# Patient Record
Sex: FEMALE | HISPANIC OR LATINO
[De-identification: names, ages, dates, MRNs, and addresses within clinical notes are randomized per-mention and may not be internally consistent; named-entity substitution may affect disease eponyms.]

---

## 2021-03-04 ENCOUNTER — RX ONLY (OUTPATIENT)
Age: 41
Setting detail: RX ONLY
End: 2021-03-04

## 2021-03-04 ENCOUNTER — APPOINTMENT (RX ONLY)
Dept: URBAN - METROPOLITAN AREA CLINIC 374 | Facility: CLINIC | Age: 41
Setting detail: DERMATOLOGY
End: 2021-03-04

## 2021-03-04 DIAGNOSIS — L70.0 ACNE VULGARIS: ICD-10-CM

## 2021-03-04 DIAGNOSIS — H02.6 XANTHELASMA OF EYELID: ICD-10-CM

## 2021-03-04 PROBLEM — H02.60 XANTHELASMA OF UNSPECIFIED EYE, UNSPECIFIED EYELID: Status: ACTIVE | Noted: 2021-03-04

## 2021-03-04 PROCEDURE — 99203 OFFICE O/P NEW LOW 30 MIN: CPT | Mod: 25

## 2021-03-04 PROCEDURE — ? PRESCRIPTION

## 2021-03-04 PROCEDURE — ? COUNSELING

## 2021-03-04 PROCEDURE — ? PHOTO-DOCUMENTATION

## 2021-03-04 PROCEDURE — ? PRESCRIPTION MEDICATION MANAGEMENT

## 2021-03-04 PROCEDURE — 17110 DESTRUCTION B9 LES UP TO 14: CPT

## 2021-03-04 PROCEDURE — ? BENIGN DESTRUCTION

## 2021-03-04 RX ORDER — TAZAROTENE 0.45 MG/G
1 LOTION TOPICAL QHS
Qty: 1 | Refills: 3 | Status: CANCELLED | COMMUNITY
Start: 2021-03-04

## 2021-03-04 RX ORDER — TAZAROTENE 0.45 MG/G
1 LOTION TOPICAL QHS
Qty: 1 | Refills: 3 | Status: ERX

## 2021-03-04 RX ADMIN — TAZAROTENE 1: 0.45 LOTION TOPICAL at 00:00

## 2021-03-04 ASSESSMENT — LOCATION DETAILED DESCRIPTION DERM
LOCATION DETAILED: LEFT SUPERIOR MEDIAL MALAR CHEEK
LOCATION DETAILED: LEFT INFERIOR CENTRAL MALAR CHEEK
LOCATION DETAILED: RIGHT MEDIAL MALAR CHEEK
LOCATION DETAILED: RIGHT SUPERIOR MEDIAL MALAR CHEEK

## 2021-03-04 ASSESSMENT — LOCATION SIMPLE DESCRIPTION DERM
LOCATION SIMPLE: RIGHT CHEEK
LOCATION SIMPLE: LEFT CHEEK

## 2021-03-04 ASSESSMENT — LOCATION ZONE DERM: LOCATION ZONE: FACE

## 2021-03-04 NOTE — PROCEDURE: BENIGN DESTRUCTION
Medical Necessity Clause: This procedure was medically necessary because the lesions that were treated were:
Render Note In Bullet Format When Appropriate: No
Medical Necessity Information: It is in your best interest to select a reason for this procedure from the list below. All of these items fulfill various CMS LCD requirements except the new and changing color options.
Consent: The patient's consent was obtained including but not limited to risks of crusting, scabbing, blistering, scarring, darker or lighter pigmentary change, recurrence, incomplete removal and infection.
Treatment Number (Will Not Render If 0): 0
Detail Level: Detailed
Post-Care Instructions: I reviewed with the patient in detail post-care instructions. Patient is to wear sunprotection, and avoid picking at any of the treated lesions. Pt may apply Vaseline to crusted or scabbing areas.

## 2021-03-04 NOTE — PROCEDURE: PRESCRIPTION MEDICATION MANAGEMENT
Render In Strict Bullet Format?: No
Detail Level: Zone
Initiate Treatment: Arazlo 0.045 % lotion Qhs: apply a pea size amount QHS to acne of face

## 2025-07-17 ENCOUNTER — OFFICE VISIT (OUTPATIENT)
Facility: HOSPITAL | Age: 45
End: 2025-07-17
Payer: COMMERCIAL

## 2025-07-17 VITALS
HEIGHT: 69 IN | HEART RATE: 76 BPM | OXYGEN SATURATION: 98 % | WEIGHT: 167 LBS | BODY MASS INDEX: 24.73 KG/M2 | DIASTOLIC BLOOD PRESSURE: 80 MMHG | SYSTOLIC BLOOD PRESSURE: 110 MMHG

## 2025-07-17 DIAGNOSIS — G43.111 INTRACTABLE MIGRAINE WITH AURA WITH STATUS MIGRAINOSUS: ICD-10-CM

## 2025-07-17 DIAGNOSIS — G43.011 INTRACTABLE MIGRAINE WITHOUT AURA AND WITH STATUS MIGRAINOSUS: Primary | ICD-10-CM

## 2025-07-17 DIAGNOSIS — D35.2 PITUITARY ADENOMA (CMS/HCC): ICD-10-CM

## 2025-07-17 PROCEDURE — 3008F BODY MASS INDEX DOCD: CPT | Performed by: PSYCHIATRY & NEUROLOGY

## 2025-07-17 PROCEDURE — 99205 OFFICE O/P NEW HI 60 MIN: CPT | Performed by: PSYCHIATRY & NEUROLOGY

## 2025-07-17 RX ORDER — RIZATRIPTAN BENZOATE 10 MG/1
TABLET ORAL
Qty: 12 TABLET | Refills: 11 | Status: SHIPPED | OUTPATIENT
Start: 2025-07-17

## 2025-07-17 NOTE — PROGRESS NOTES
Patient ID: Mery Beaulieu                              : 1980  MRN: [id number]                                            VISIT DATE: 2025   ENCOUNTER PROVIDER: Monique Astudillo  REFERRING PROVIDER: Charleen Wilde DO    I had the pleasure of evaluating Mery Beaulieu in the [facility] on 2025 for a new patient visit. The history was provided by Mery Beaulieu and supplemented by her medical records.    CHIEF COMPLAINT: Headache.    HISTORY OF PRESENT ILLNESS:  Mery developed headaches at age 14. The headaches started without known precipitating event (i.e. illness, new medications, head/neck injury, or significant stressor) the year after menarche. She has a sister that also has migraine headaches. The headaches have always been intermittent and the pattern and features have not changed, but over the last year they have become more intense and incapacitating.    Importantly, she also has a pituitary adenoma that measured 7 mm in 2019. She thinks she may have had another MRI since then, but we don't have any records of that one.      She was seen at [facility] in 2019.    Headache Semiology:  Frequency: once a month, lasting 2-3 days. Headache free otherwise.   Location: bilateral, starts in the neck and trapezius and gradually radiates to the sides of the head, temples and eyes.   Quality: pressure, throbbing, stabbing.  Severity: severe and incapacitating  Duration: 48-72 hours  Timing or pattern: no  Aggravation by regular physical activity: yes  Associated features: photophobia, phonophobia, osmophobia, nausea, and vomiting. Sore stiff muscles in the neck at the beginning of headaches.  Presence of aura: sometimes sees a light or bright spot at the beginning and this resolves in 1-2 hours. No focal neurologic symptoms.  No unilateral cranial autonomic symptoms (lacrimation, conjunctival injection, nasal congestion or rhinorrhea, ptosis, eyelid edema,  forehead/facial sweating, miosis) restlessness or agitation.   Positional headache: no   Precipitated by Valsalva maneuvers: no  Neck pain: only with the migraine episodes  Relieving factors: rest and ice  Exacerbating factors: as above  Related to menstrual cycle: sometimes, but not consistently   Other triggers: unknown  Disability: Unable to perform usual activities (work/school/family/social) completely or partially when headache is severe.      PAST TREATMENTS/MEDICATIONS:  Preventive:  None tried prior to initial visit  Acute or as needed:  Sumigran plus (ergotamine 1 mg, ibuprofen 400 mg and caffeine 50 mg) worked x 1. This is OTC in [facility].   Tried something years ago that caused nausea - probably sumatriptan.  Excedrin did not help  Tylenol did not help  Ibuprofen did not help  Naproxen did not help  IV medications/Hospitalizations:  N/A  Non-Pharmacologic:  N/A    MEDICATIONS AT START OF VISIT:    Current Outpatient Medications:     rizatriptan (MAXALT) 10 mg tablet, 1 tab at onset of severe headache. May repeat in 2 hours if needed. Maximum 2 tab/day and 2 days/week., Disp: 12 tablet, Rfl: 11    multivit comb no.63-folic acid 400 mcg capsule, Take 1 capsule by mouth daily., Disp: , Rfl:     ALLERGIES: has no known allergies.     REVIEW OF SYSTEMS: As discussed above. Sometimes constipation. Otherwise, all other ROS were reviewed and negative.    PAST MEDICAL HISTORY:  has no past medical history on file. Negative.    PAST SURGICAL HISTORY:  has no past surgical history on file. Negative.     FAMILY HISTORY: family history is not on file. Migriane in one sister.    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    She is from [place]    3 children.     Mery is not planing to have mor children.  had a  vasectomy. I have discussed with her the possible teratogenic effects of some medications and she verbalized understanding.     PHYSICAL EXAMINATION:    Vitals:    07/17/25 1006   BP: 110/80   Pulse: 76   SpO2: 98%      General: Well developed, well nourished, in no acute distress.  Craniofacial examination: Normal. No evidence of temporomandibular joint disease. No trigger points.    Neck: Full range of motion. No trigger points.     NEUROLOGICAL EXAM:  Alert and oriented. Normal attention span and concentration. Normal language and speech.  Cranial nerves:   Ophthalmoscopic examination normal with sharp disc margins bilaterally.   II-VI: Pupils were equal, round, and reactive to light and accommodation. Extraocular movements were intact without nystagmus.  V: Intact sensation to light touch in the distribution of the three trigeminal branches.   VII: Face was symmetric with normal strength.   VIII: Air conduction intact bilaterally.  IX, X: Uvula midline, palate contracts and elevates symmetrically, normal voice.  XI: Normal shoulder elevation and head turning.  XII: Tongue protrudes midline, normal bulk and without fasciculations.   Muscle strength in upper and lower extremities: 5/5 throughout symmetrically. Pronator drift was negative.  Muscle tone in upper and lower extremities was normal.   There were no abnormal movements.  Deep tendon reflexes in upper and lower extremities: 2+ symmetrically throughout.   Coordination: intact bilaterally to finger-nose testing.  Sensation: intact to light touch in four extremities.  Gait: narrow based, normal tandem walk, and negative Romberg.       Data Reviewed:   MRI BRAIN W WO CONTRAST (6/18/2019) [facility]  INDICATION: Pituitary microadenoma, with attn to sella   COMPARISON: None at this institution   IMPRESSION: Some asymmetry of a nonenlarged pituitary gland, without a discrete enhancement defect identified.   TECHNIQUE: MR imaging of the brain with attention to the  "sella was obtained before and after administration of intravenous contrast (15 ml of Dotarem ).   FINDINGS: Ventricles and sulci are normal in size and configuration for the patient's age. There is no acute parenchymal hemorrhage, extra-axial fluid collection, mass effect, midline shift, territorial infarct, or hydrocephalus. There is no restricted diffusion to suggest a recent infarct. The cerebellar tonsils are normally positioned. The major vessel flow-voids are preserved at the skull base. Images of the brain obtained after contrast administration show no areas of abnormal parenchymal enhancement. The sella is not enlarged, the pituitary gland is not enlarged by size criteria; the maximum craniocaudal height of the gland a 7 mm. The left side of the gland is somewhat larger than the right, with subtle convexity inferiorly. The stalk is nearly midline. The cavernous sinuses and optic chiasm are unremarkable. It is difficult to identify enhancement defect within the pituitary gland. Correlate with remote outside imaging would be helpful. The nasopharynx is symmetric. Paranasal sinuses are mostly clear. Right mastoid is not well developed. There is scattered mastoid mucosal thickening. No gross orbital mass is seen. Bone marrow signal is age-appropriate.       No labs available.    Neuro note (2019) Dr. Strickland. [facility].  \"Assessment & Plan  Mery is currently a 38-year-old right-handed woman who we follow in the office for episodic menstrually associated migraine headaches. We discussed options. I do not believe she needs a daily preventive medicine currently. We asked that she attempt Frova 2.5 mg p.o. b.i.d. through her premenstrual time frame in hopes that prevent taking her menstrually associated migraine headaches. We reviewed in detail the potential side effects and she appears to understand.  She has a pituitary microadenoma in her last imaging study was back in 2012. We have asked that she " "undergo a follow-up MRI of the brain with attention to the sella to ensure stability. I asked her to call me after this study is done. Thank you for allowing us to participate in the care of your patient.\"    IMPRESSION/PLAN:  Mery Beaulieu is a very pleasant 45 y.o. woman with a pituitary adenoma seen initially in July 2025 for intermittent severe headaches that started at age 14. Neurological exam was unremarkable. Brain MRI (2019) revealed 7 mm pituitary adenoma. There are no atypical features or symptoms suggestive of a serious underlying condition or secondary headache.     In my opinion, she has episodic migraine without aura and with visual aura.     At this time, I recommend a trial of rizatriptan for acute treatment. She does not need preventive medications at this time.  I ordered a pituitary MRI, prolactin level and made a referral to [facility]. See detailed recommendations below.    Diagnosis:  Episodic migraine without aura and with visual aura  Pituitary adenoma     Evaluation:  Pituitary MRI ordered today.  Prolactin level ordered today  Referral to [facility] (Dr. Wilde) at [place] made today - Call to make appointment [phone]   No additional tests are needed at this time. If there are new symptoms or changes in the characteristics of the headaches, I will re-evaluate Mery and consider if diagnostic tests are needed.     Treatment plan:      1. Healthy Habits: The following recommendations can greatly reduce the number and severity of headaches.  Maintain regular sleep hours and get sufficient sleep (8-9 hours). Consider using the free audrey \"Insomnia \" if you have sleep problems.  Do not skip meals, especially breakfast  Drink at least 64 oz or 8 cups of water daily - enough to urinate 5-6 times a day  Get at least 30 minutes of daily aerobic exercise (enough to increase your heart rate and sweat)    We recommend keeping track of the headaches and acute medication (prescription or " over-the-counter). May use the free audrey [facility] Migraine Tracker or any other method (notebook, calendar, etc) to log the headaches and use of medication. Please, bring that to your clinic visits.    2. Acute Treatment (limited to 2 days per week, in general):     Trial of rizatriptan 10 mg as needed at onset of moderate to severe headache. May repeat once 2 hours later. Maximum 2 doses in 24 hr. Limit to 2 days/week. Recommend taking only half a tablet if it is the first time you try this medication. After that, if there are no side effects, a full tablet can be used.    Future consideration: Other triptans, Ubrelvy (ubrogepant), or Nurtec (rimegepant).  I may prescribe a different triptan if needed before the next visit.    Reyvow (lasmiditan), Sprix (ketorolac) nasal spray, Zavzpret (zavegepant) nasal spray, or Trudhesa (dihydroergotamine) nasal spray can be considered for acute/rescue treatment.     We may add an antinausea medication if needed for nausea or as co-adjuvant if monotherapy does not provide enough relief.       3. Preventive Treatment (when headaches occur more than 1 day/week or interfere with functioning):     Not indicated at this time based on current headache frequency.    Future consideration: nutraceuticals    Nutraceutical options include: riboflavin, magnesium, melatonin, feverfew, and coenzyme Q10.     Options for oral preventive medications include: amitriptyline or nortriptyline, betablockers, topiramate, zonisamide, atogepant (Qulipta), rimegepant (Nurtec), valproate, verapamil, gabapentin, pregabaline, duloxetine, candesartan, namenda, escitalopram, venlafaxine, and levetiracetam.     Procedures that can be used to prevent headaches include: botox (if headaches occur 15 or more days/month), nerve blocks and trigger point injections. Most insurances require trying at least 2 oral preventive medications before they will cover botox.    Anti CGRP monoclonal antibodies (Erenumab or  Aimovig, Fremanezumab or Ajovy, Galcanezumab or Emgality, and Eptinezumab or Vyepti) are a group of biological injectable treatments approved for prevention of migraine. Most insurances require trying at least 2 oral preventive medications before they will cover any of the anti-CGRP antibodies.     Several non-invasive neuromodulation devices (GammaCore, Cefaly and Nerivio) are available to treat headaches preventively and/or acutely. These are typically not covered by insurance, but the companies have a trial period and will refund the cost of the device if ineffective and returned within the trial period.     Follow-up in the Headache Clinic in 4-6 months.     We appreciate the opportunity to participate in the care of Mery.     Please, do not hesitate to call me at [phone] if you have any questions or concerns.         Monique Beaulieu MD  [facility]  [phone]    I spent 63 minutes on this date of service performing the following activities: obtaining history, performing examination, entering orders, documenting, preparing for visit, obtaining / reviewing records, and providing counseling and education.

## 2025-07-17 NOTE — LETTER
2025     Charleen Wilde DO  [address]  [facility] [address]    Patient: Mery Beaulieu  YOB: 1980  Date of Visit: 2025      Dear Dr. Wilde:    Thank you for referring Mery Beaulieu to me for evaluation. Below are my notes for this consultation.    If you have questions, please do not hesitate to call me. I look forward to following your patient along with you.         Sincerely,        Monique Astudillo MD        CC: MD Baudilio Mosquera Ana, MD  2025 11:15 AM  Sign when Signing Visit  Patient ID: Mery Beaulieu                              : 1980  MRN: [id number]                                            VISIT DATE: 2025   ENCOUNTER PROVIDER: Monique Astudillo  REFERRING PROVIDER: Charleen Wilde DO    I had the pleasure of evaluating Mery Beaulieu in the [facility] on 2025 for a new patient visit. The history was provided by Mery Beaulieu and supplemented by her medical records.    CHIEF COMPLAINT: Headache.    HISTORY OF PRESENT ILLNESS:  Mery developed headaches at age 14. The headaches started without known precipitating event (i.e. illness, new medications, head/neck injury, or significant stressor) the year after menarche. She has a sister that also has migraine headaches. The headaches have always been intermittent and the pattern and features have not changed, but over the last year they have become more intense and incapacitating.    Importantly, she also has a pituitary adenoma that measured 7 mm in 2019. She thinks she may have had another MRI since then, but we don't have any records of that one.      She was seen at [facility] in 2019.    Headache Semiology:  Frequency: once a month, lasting 2-3 days. Headache free otherwise.   Location: bilateral, starts in the neck and trapezius and gradually radiates to the sides of the head, temples and eyes.   Quality:  pressure, throbbing, stabbing.  Severity: severe and incapacitating  Duration: 48-72 hours  Timing or pattern: no  Aggravation by regular physical activity: yes  Associated features: photophobia, phonophobia, osmophobia, nausea, and vomiting. Sore stiff muscles in the neck at the beginning of headaches.  Presence of aura: sometimes sees a light or bright spot at the beginning and this resolves in 1-2 hours. No focal neurologic symptoms.  No unilateral cranial autonomic symptoms (lacrimation, conjunctival injection, nasal congestion or rhinorrhea, ptosis, eyelid edema, forehead/facial sweating, miosis) restlessness or agitation.   Positional headache: no   Precipitated by Valsalva maneuvers: no  Neck pain: only with the migraine episodes  Relieving factors: rest and ice  Exacerbating factors: as above  Related to menstrual cycle: sometimes, but not consistently   Other triggers: unknown  Disability: Unable to perform usual activities (work/school/family/social) completely or partially when headache is severe.      PAST TREATMENTS/MEDICATIONS:  Preventive:  None tried prior to initial visit  Acute or as needed:  Sumigran plus (ergotamine 1 mg, ibuprofen 400 mg and caffeine 50 mg) worked x 1. This is OTC in [facility].   Tried something years ago that caused nausea - probably sumatriptan.  Excedrin did not help  Tylenol did not help  Ibuprofen did not help  Naproxen did not help  IV medications/Hospitalizations:  N/A  Non-Pharmacologic:  N/A    MEDICATIONS AT START OF VISIT:    Current Outpatient Medications:   •  rizatriptan (MAXALT) 10 mg tablet, 1 tab at onset of severe headache. May repeat in 2 hours if needed. Maximum 2 tab/day and 2 days/week., Disp: 12 tablet, Rfl: 11  •  multivit comb no.63-folic acid 400 mcg capsule, Take 1 capsule by mouth daily., Disp: , Rfl:     ALLERGIES: has no known allergies.     REVIEW OF SYSTEMS: As discussed above. Sometimes constipation. Otherwise, all other ROS were reviewed and  negative.    PAST MEDICAL HISTORY:  has no past medical history on file. Negative.    PAST SURGICAL HISTORY:  has no past surgical history on file. Negative.     FAMILY HISTORY: family history is not on file. Migriane in one sister.    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    She is from [place]    3 children.     Mery is not planing to have mor children.  had a vasectomy. I have discussed with her the possible teratogenic effects of some medications and she verbalized understanding.     PHYSICAL EXAMINATION:    Vitals:    07/17/25 1006   BP: 110/80   Pulse: 76   SpO2: 98%      General: Well developed, well nourished, in no acute distress.  Craniofacial examination: Normal. No evidence of temporomandibular joint disease. No trigger points.    Neck: Full range of motion. No trigger points.     NEUROLOGICAL EXAM:  Alert and oriented. Normal attention span and concentration. Normal language and speech.  Cranial nerves:   Ophthalmoscopic examination normal with sharp disc margins bilaterally.   II-VI: Pupils were equal, round, and reactive to light and accommodation. Extraocular movements were intact without nystagmus.  V: Intact sensation to light touch in the distribution of the three trigeminal branches.   VII: Face was symmetric with normal strength.   VIII: Air conduction intact bilaterally.  IX, X: Uvula midline, palate contracts and elevates symmetrically, normal voice.  XI: Normal shoulder elevation and head turning.  XII: Tongue protrudes midline, normal bulk and without fasciculations.   Muscle strength in upper and lower extremities: 5/5 throughout symmetrically. Pronator drift was negative.  Muscle tone in upper and lower extremities was normal.   There were no abnormal movements.  Deep tendon  reflexes in upper and lower extremities: 2+ symmetrically throughout.   Coordination: intact bilaterally to finger-nose testing.  Sensation: intact to light touch in four extremities.  Gait: narrow based, normal tandem walk, and negative Romberg.       Data Reviewed:   MRI BRAIN W WO CONTRAST (6/18/2019) [facility]  INDICATION: Pituitary microadenoma, with attn to sella   COMPARISON: None at this institution   IMPRESSION: Some asymmetry of a nonenlarged pituitary gland, without a discrete enhancement defect identified.   TECHNIQUE: MR imaging of the brain with attention to the sella was obtained before and after administration of intravenous contrast (15 ml of Dotarem ).   FINDINGS: Ventricles and sulci are normal in size and configuration for the patient's age. There is no acute parenchymal hemorrhage, extra-axial fluid collection, mass effect, midline shift, territorial infarct, or hydrocephalus. There is no restricted diffusion to suggest a recent infarct. The cerebellar tonsils are normally positioned. The major vessel flow-voids are preserved at the skull base. Images of the brain obtained after contrast administration show no areas of abnormal parenchymal enhancement. The sella is not enlarged, the pituitary gland is not enlarged by size criteria; the maximum craniocaudal height of the gland a 7 mm. The left side of the gland is somewhat larger than the right, with subtle convexity inferiorly. The stalk is nearly midline. The cavernous sinuses and optic chiasm are unremarkable. It is difficult to identify enhancement defect within the pituitary gland. Correlate with remote outside imaging would be helpful. The nasopharynx is symmetric. Paranasal sinuses are mostly clear. Right mastoid is not well developed. There is scattered mastoid mucosal thickening. No gross orbital mass is seen. Bone marrow signal is age-appropriate.       No labs available.    Neuro note (2019) Dr. Strickland. [facility].  \"Assessment & Plan  Mery is currently a 38-year-old right-handed woman who we follow in the office for episodic menstrually associated migraine headaches. We discussed options. I do not believe she needs a daily preventive medicine currently. We asked that she attempt Frova 2.5 mg p.o. b.i.d. through her premenstrual time frame in hopes that prevent taking her menstrually associated migraine headaches. We reviewed in detail the potential side effects and she appears to understand.  She has a pituitary microadenoma in her last imaging study was back in 2012. We have asked that she undergo a follow-up MRI of the brain with attention to the sella to ensure stability. I asked her to call me after this study is done. Thank you for allowing us to participate in the care of your patient.\"    IMPRESSION/PLAN:  Mery Beaulieu is a very pleasant 45 y.o. woman with a pituitary adenoma seen initially in July 2025 for intermittent severe headaches that started at age 14. Neurological exam was unremarkable. Brain MRI (2019) revealed 7 mm pituitary adenoma. There are no atypical features or symptoms suggestive of a serious underlying condition or secondary headache.     In my opinion, she has episodic migraine without aura and with visual aura.     At this time, I recommend a trial of rizatriptan for acute treatment. She does not need preventive medications at this time.  I ordered a pituitary MRI, prolactin level and made a referral to [facility]. See detailed recommendations below.    Diagnosis:  Episodic migraine without aura and with visual aura  Pituitary adenoma     Evaluation:  Pituitary MRI ordered today.  Prolactin level ordered today  Referral to NSG (Dr. Wilde) at [place] made today - Call to make appointment [phone]   No additional tests are needed at this time. If there are new symptoms or changes in the characteristics of the headaches, I will re-evaluate Mery and consider if diagnostic tests are " "needed.     Treatment plan:      1. Healthy Habits: The following recommendations can greatly reduce the number and severity of headaches.  Maintain regular sleep hours and get sufficient sleep (8-9 hours). Consider using the free audrey \"Insomnia \" if you have sleep problems.  Do not skip meals, especially breakfast  Drink at least 64 oz or 8 cups of water daily - enough to urinate 5-6 times a day  Get at least 30 minutes of daily aerobic exercise (enough to increase your heart rate and sweat)    We recommend keeping track of the headaches and acute medication (prescription or over-the-counter). May use the free audrey [place] Migraine Tracker or any other method (notebook, calendar, etc) to log the headaches and use of medication. Please, bring that to your clinic visits.    2. Acute Treatment (limited to 2 days per week, in general):     Trial of rizatriptan 10 mg as needed at onset of moderate to severe headache. May repeat once 2 hours later. Maximum 2 doses in 24 hr. Limit to 2 days/week. Recommend taking only half a tablet if it is the first time you try this medication. After that, if there are no side effects, a full tablet can be used.    Future consideration: Other triptans, Ubrelvy (ubrogepant), or Nurtec (rimegepant).  I may prescribe a different triptan if needed before the next visit.    Reyvow (lasmiditan), Sprix (ketorolac) nasal spray, Zavzpret (zavegepant) nasal spray, or Trudhesa (dihydroergotamine) nasal spray can be considered for acute/rescue treatment.     We may add an antinausea medication if needed for nausea or as co-adjuvant if monotherapy does not provide enough relief.       3. Preventive Treatment (when headaches occur more than 1 day/week or interfere with functioning):     Not indicated at this time based on current headache frequency.    Future consideration: nutraceuticals    Nutraceutical options include: riboflavin, magnesium, melatonin, feverfew, and coenzyme Q10.     Options " for oral preventive medications include: amitriptyline or nortriptyline, betablockers, topiramate, zonisamide, atogepant (Qulipta), rimegepant (Nurtec), valproate, verapamil, gabapentin, pregabaline, duloxetine, candesartan, namenda, escitalopram, venlafaxine, and levetiracetam.     Procedures that can be used to prevent headaches include: botox (if headaches occur 15 or more days/month), nerve blocks and trigger point injections. Most insurances require trying at least 2 oral preventive medications before they will cover botox.    Anti CGRP monoclonal antibodies (Erenumab or Aimovig, Fremanezumab or Ajovy, Galcanezumab or Emgality, and Eptinezumab or Vyepti) are a group of biological injectable treatments approved for prevention of migraine. Most insurances require trying at least 2 oral preventive medications before they will cover any of the anti-CGRP antibodies.     Several non-invasive neuromodulation devices (GammaCore, Cefaly and Nerivio) are available to treat headaches preventively and/or acutely. These are typically not covered by insurance, but the companies have a trial period and will refund the cost of the device if ineffective and returned within the trial period.     Follow-up in the Headache Clinic in 4-6 months.     We appreciate the opportunity to participate in the care of Mery.     Please, do not hesitate to call me at [phone] if you have any questions or concerns.         Monique Beaulieu MD  [facility]  [phone]    I spent 63 minutes on this date of service performing the following activities: obtaining history, performing examination, entering orders, documenting, preparing for visit, obtaining / reviewing records, and providing counseling and education.

## 2025-07-17 NOTE — PATIENT INSTRUCTIONS
"Diagnosis:  Episodic migraine without aura and with visual aura     Evaluation:  Pituitary MRI ordered today.  Prolactin level ordered today  Ref[place] [facility] [place] made today - Call to make appointment [phone]   No additional tests are needed at this time. If there are new symptoms or changes in the characteristics of the headaches, I will re-evaluate Mery and consider if diagnostic tests are needed.     Treatment plan:      1. Healthy Habits: The following recommendations can greatly reduce the number and severity of headaches.  Maintain regular sleep hours and get sufficient sleep (8-9 hours). Consider using the free audrey \"Insomnia \" if you have sleep problems.  Do not skip meals, especially breakfast  Drink at least 64 oz or 8 cups of water daily - enough to urinate 5-6 times a day  Get at least 30 minutes of daily aerobic exercise (enough to increase your heart rate and sweat)    We recommend keeping track of the headaches and acute medication (prescription or over-the-counter). May use the free audrey [place] Migraine Tracker or any other method (notebook, calendar, etc) to log the headaches and use of medication. Please, bring that to your clinic visits.    2. Acute Treatment (limited to 2 days per week, in general):     Trial of rizatriptan 10 mg as needed at onset of moderate to severe headache. May repeat once 2 hours later. Maximum 2 doses in 24 hr. Limit to 2 days/week. Recommend taking only half a tablet if it is the first time you try this medication. After that, if there are no side effects, a full tablet can be used.    Future consideration: Other triptans, Ubrelvy (ubrogepant), or Nurtec (rimegepant).  I may prescribe a different triptan if needed before the next visit.    Reyvow (lasmiditan), Sprix (ketorolac) nasal spray, Zavzpret (zavegepant) nasal spray, or Trudhesa (dihydroergotamine) nasal spray can be considered for acute/rescue treatment.     We may add an antinausea " medication if needed for nausea or as co-adjuvant if monotherapy does not provide enough relief.       3. Preventive Treatment (when headaches occur more than 1 day/week or interfere with functioning):     Not indicated at this time based on current headache frequency.    Future consideration: nutraceuticals    Nutraceutical options include: riboflavin, magnesium, melatonin, feverfew, and coenzyme Q10.     Options for oral preventive medications include: amitriptyline or nortriptyline, betablockers, topiramate, zonisamide, atogepant (Qulipta), rimegepant (Nurtec), valproate, verapamil, gabapentin, pregabaline, duloxetine, candesartan, namenda, escitalopram, venlafaxine, and levetiracetam.     Procedures that can be used to prevent headaches include: botox (if headaches occur 15 or more days/month), nerve blocks and trigger point injections. Most insurances require trying at least 2 oral preventive medications before they will cover botox.    Anti CGRP monoclonal antibodies (Erenumab or Aimovig, Fremanezumab or Ajovy, Galcanezumab or Emgality, and Eptinezumab or Vyepti) are a group of biological injectable treatments approved for prevention of migraine. Most insurances require trying at least 2 oral preventive medications before they will cover any of the anti-CGRP antibodies.     Several non-invasive neuromodulation devices (GammaCore, Cefaly and Nerivio) are available to treat headaches preventively and/or acutely. These are typically not covered by insurance, but the companies have a trial period and will refund the cost of the device if ineffective and returned within the trial period.     Follow-up in the Headache Clinic in 4-6 months.

## 2025-07-18 LAB — PROLACTIN SERPL-MCNC: 16 NG/ML (ref 4.8–33.4)

## 2025-07-24 ENCOUNTER — HOSPITAL ENCOUNTER (OUTPATIENT)
Dept: RADIOLOGY | Facility: HOSPITAL | Age: 45
Discharge: HOME | End: 2025-07-24
Attending: PSYCHIATRY & NEUROLOGY
Payer: COMMERCIAL

## 2025-07-24 DIAGNOSIS — D35.2 PITUITARY ADENOMA (CMS/HCC): ICD-10-CM

## 2025-07-24 PROCEDURE — A9579 GAD-BASE MR CONTRAST NOS,1ML: HCPCS | Performed by: PSYCHIATRY & NEUROLOGY

## 2025-07-24 PROCEDURE — A9573: HCPCS | Performed by: PSYCHIATRY & NEUROLOGY

## 2025-07-24 PROCEDURE — 70553 MRI BRAIN STEM W/O & W/DYE: CPT

## 2025-07-24 RX ADMIN — GADOPICLENOL 7.6 ML: 485.1 INJECTION INTRAVENOUS at 09:46

## 2025-07-29 ENCOUNTER — TELEPHONE (OUTPATIENT)
Facility: HOSPITAL | Age: 45
End: 2025-07-29
Payer: COMMERCIAL

## 2025-07-29 NOTE — TELEPHONE ENCOUNTER
I called Mery today to review the MRI results.    She confirmed that she was diagnosed with a pituitary microadenoma around age 20 in [place]. She also reports that she had an elevated prolactin and required treatment for this for a few years.    She had repeated brain MRI annually until 2019 and then none during COVID.    She has an appointment with Dr. Wilde in [facility] [place] [place] in September and she would like to keep the appointment and have a consultation with him    I recommend getting the old MRIs (CD and report) or at least one from when she was being treated in her 20's and early 30's. She will bring these records and the CD of the most recent MRI to the visit with Dr. Wilde.    Monique Beaulieu MD  [facility]  [phone]